# Patient Record
(demographics unavailable — no encounter records)

---

## 2024-12-17 NOTE — PHYSICAL EXAM
[No Acute Distress] : no acute distress [Well Nourished] : well nourished [No Respiratory Distress] : no respiratory distress  [No Accessory Muscle Use] : no accessory muscle use [Clear to Auscultation] : lungs were clear to auscultation bilaterally [Normal Rate] : normal rate  [Regular Rhythm] : with a regular rhythm [Normal S1, S2] : normal S1 and S2 [Soft] : abdomen soft [Non Tender] : non-tender [Non-distended] : non-distended [No Focal Deficits] : no focal deficits [Normal Insight/Judgement] : insight and judgment were intact

## 2024-12-19 NOTE — REVIEW OF SYSTEMS
[Fever] : no fever [Chills] : no chills [Night Sweats] : no night sweats [Chest Pain] : no chest pain [Palpitations] : no palpitations [Shortness Of Breath] : no shortness of breath [Abdominal Pain] : no abdominal pain [Nausea] : no nausea [Vomiting] : no vomiting [Dysuria] : no dysuria [Hematuria] : no hematuria [Headache] : no headache [Dizziness] : no dizziness

## 2024-12-19 NOTE — PLAN
[FreeTextEntry1] : #Anxiety Hx of anxiety, takes xanax at home prn for anxiety, worse when traveling Plan: Rx for lorazepam given, patient is traveling this month, 20 tablets for 30 day supply Instructed if anxiety is worsening, will consider psych referral  #Hx HSV infection Pt reports hx of HSV infection, has oral outbreaks, worsening during stressful periods Plan: Rx for valacyclovir sent, instructed patient to take only if she has oral outbreak  #Migraine Hx migraines, follows with neurology Takes nurtec prn for migraines, propranolol everyday with relief Plan: Neuro follow up #Health Maintenance F/u A1c, cbc, cmp, tsh

## 2024-12-19 NOTE — END OF VISIT
[] : Resident [FreeTextEntry3] : Gastroenteritis f/u, after hospital DC. STable.   Situational anxiety when flying - Alprazolam prn  Anxiety/ ADHD - has been holding stimulant tx , awaiting for evaluation in [place]

## 2024-12-19 NOTE — HISTORY OF PRESENT ILLNESS
[Post-hospitalization from ___ Hospital] : Post-hospitalization from [unfilled] Hospital [FreeTextEntry2] : 27 y/o F PMHx anxiety, ADHD, migraines who presents post-hospital discharge and to establish care. Patient was admitted to [facility] end of November after complaints of intractable nausea and vomiting secondary to gastroenteritiis. Since her hospital discharge, she says she has been feeling well and in her usual state of health. She is on a 6 week leave for work in order to get her anxiety better under control and take time to relax. She reports that she has a history of migraines since age 13 and follows with neurology  who she last saw 6 weeks ago. She reports a history of anxiety worse when flying and takes Xanax as needed for her anxiety. She states she takes propanolol for migraines, took one dose this morning.

## 2024-12-19 NOTE — HISTORY OF PRESENT ILLNESS
[Post-hospitalization from ___ Hospital] : Post-hospitalization from [unfilled] Hospital [FreeTextEntry2] : 25 y/o F PMHx anxiety, ADHD, migraines who presents post-hospital discharge and to establish care. Patient was admitted to [facility] end of November after complaints of intractable nausea and vomiting secondary to gastroenteritiis. Since her hospital discharge, she says she has been feeling well and in her usual state of health. She is on a 6 week leave for work in order to get her anxiety better under control and take time to relax. She reports that she has a history of migraines since age 13 and follows with neurology  who she last saw 6 weeks ago. She reports a history of anxiety worse when flying and takes Xanax as needed for her anxiety. She states she takes propanolol for migraines, took one dose this morning.